# Patient Record
Sex: FEMALE | Race: AMERICAN INDIAN OR ALASKA NATIVE
[De-identification: names, ages, dates, MRNs, and addresses within clinical notes are randomized per-mention and may not be internally consistent; named-entity substitution may affect disease eponyms.]

---

## 2017-02-10 NOTE — MAMMOGRAPHY REPORT
BONE DEXA:02/10/17 10:24:00



CLINICAL: Postmenopausal. No comparison.



TECHNIQUE: Two site bone DEXA performed on an Hologic scanner.





FINDINGS:

The average BMD of the lumbar spine L1-L4 is 1.118g/cm squared with a 

T-score of -0.3 and a Z-score of by 2.1.



The average BMD of the left hip is 0.831g/cm squared with a T-score of 

-1.3 and a Z-score of -0.1.





IMPRESSION:

1. WHO classification: Normal with average fracture risk  based on 

lumbar spine measurements.



2. WHO classification: Osteopenia with increased fracture risk  based 

on left hip measurements.





RECOMMENDATION: Clinical correlation and routine screening.





DEFINITIONS:

  BMD     = Bone Mineral Density

  T-score = BMD related to mean peak bone mass of young adult

            (mean expressed in Standard Deviation)

  Z-score = Age matched BMD expressed in SD



World Health Organization (WHO) Diagnostic Criteria

  Normal             T-score > -1 SD

  Osteopenia      T-score between -1 and -2.4 SD

  Osteoporosis    T-score -2.5 SD or below



NOTE:

      BMD is not the only risk factor for fracture. One should also 

consider factors such as the patient's age, risk of falling, previous 

osteoporotic fracture, family history of osteoporotic fractures, 

current smoker, and low body weight.

Z-scores are not calculated if >80 years of age.

## 2017-02-10 NOTE — MAMMOGRAPHY REPORT
BILATERAL DIGITAL SCREENING MAMMOGRAM with CAD: 02/10/17 10:24:00



CLINICAL: Routine screening.



COMPARISON:01/28/15



FINDINGS: The breasts are heterogeneously dense, which may obscure 

small masses. No mass, architectural distortion or suspicious 

calcifications.



IMPRESSION: No mammographic evidence of malignancy.



BI-RADS CATEGORY: 1 - - Negative



RECOMMENDATION: Routine mammographic screening in one year.





COMMENT:

Patient follow-up letters are generated by our [employer] application.  

The

## 2020-01-16 ENCOUNTER — HOSPITAL ENCOUNTER (EMERGENCY)
Dept: HOSPITAL 5 - ED | Age: 75
Discharge: HOME | End: 2020-01-16
Payer: MEDICARE

## 2020-01-16 VITALS — SYSTOLIC BLOOD PRESSURE: 142 MMHG | DIASTOLIC BLOOD PRESSURE: 74 MMHG

## 2020-01-16 DIAGNOSIS — M54.9: ICD-10-CM

## 2020-01-16 DIAGNOSIS — N20.0: ICD-10-CM

## 2020-01-16 DIAGNOSIS — E78.5: ICD-10-CM

## 2020-01-16 DIAGNOSIS — M54.5: Primary | ICD-10-CM

## 2020-01-16 DIAGNOSIS — E11.9: ICD-10-CM

## 2020-01-16 DIAGNOSIS — Z90.710: ICD-10-CM

## 2020-01-16 DIAGNOSIS — I10: ICD-10-CM

## 2020-01-16 LAB
BILIRUB UR QL STRIP: (no result)
BLOOD UR QL VISUAL: (no result)
PH UR STRIP: 7 [PH] (ref 5–7)
PROT UR STRIP-MCNC: (no result) MG/DL
RBC #/AREA URNS HPF: 5 /HPF (ref 0–6)
UROBILINOGEN UR-MCNC: < 2 MG/DL (ref ?–2)
WBC #/AREA URNS HPF: < 1 /HPF (ref 0–6)

## 2020-01-16 PROCEDURE — 74176 CT ABD & PELVIS W/O CONTRAST: CPT

## 2020-01-16 PROCEDURE — 81001 URINALYSIS AUTO W/SCOPE: CPT

## 2020-01-16 NOTE — EMERGENCY DEPARTMENT REPORT
ED General Adult HPI





- General


Chief complaint: Back Pain/Injury


Stated complaint: LOWER BACK PAIN EXTREME


Time Seen by Provider: 20 13:25


Source: patient


Mode of arrival: Ambulatory


Limitations: No Limitations





- History of Present Illness


Initial comments: 


75yo BF states that she has low-mid back pain that has been present for weeks 

but became worse last night. She denies injury or fall. Pt states that she 

receives some comfort while lying flat but then she begins to have muscle 

spasms.





-: week(s) (3)


Location: back


Radiation: non-radiation


Severity scale (0 -10): 10


Quality: aching


Consistency: constant


Improves with: none


Worsens with: movement


Associated Symptoms: denies other symptoms


Treatments Prior to Arrival: none





- Related Data


                                    Allergies











Allergy/AdvReac Type Severity Reaction Status Date / Time


 


No Known Allergies Allergy   Unverified 20 09:52














ED Review of Systems


ROS: 


Stated complaint: LOWER BACK PAIN EXTREME


Other details as noted in HPI





Constitutional: no symptoms reported


Musculoskeletal: as per HPI





ED Past Medical Hx





- Past Medical History


Previous Medical History?: Yes


Hx Hypertension: Yes


Hx Diabetes: Yes


Additional medical history: Hyperlipidemia





- Surgical History


Past Surgical History?: Yes


Additional Surgical History: hysterectomy, intestin





- Social History


Smoking Status: Never Smoker


Substance Use Type: None





ED Physical Exam





- General


Limitations: No Limitations


General appearance: alert, in no apparent distress





- Head


Head exam: Present: atraumatic, normocephalic





- Eye


Eye exam: Present: normal appearance, PERRL, EOMI


Pupils: Present: normal accommodation





- ENT


ENT exam: Present: normal exam, normal orophraynx





- Neck


Neck exam: Present: normal inspection, full ROM.  Absent: tenderness





- Respiratory


Respiratory exam: Present: normal lung sounds bilaterally.  Absent: respiratory 

distress, wheezes





- Cardiovascular


Cardiovascular Exam: Present: regular rate, normal rhythm, normal heart sounds





- GI/Abdominal


GI/Abdominal exam: Present: soft.  Absent: distended, tenderness





- Rectal


Rectal exam: Present: deferred





- Extremities Exam


Extremities exam: Present: normal inspection, full ROM.  Absent: tenderness





- Back Exam


Back exam: Present: full ROM (limted flexion and extension), vertebral 

tenderness (lower lumbar tenderness).  Absent: CVA tenderness (R), CVA 

tenderness (L)





- Neurological Exam


Neurological exam: Present: alert, altered, oriented X3, normal gait





- Psychiatric


Psychiatric exam: Present: normal affect, normal mood.  Absent: depressed





- Skin


Skin exam: Present: warm, dry, intact





ED Course


                                   Vital Signs











  20





  09:56 10:13 14:52


 


Temperature 98.1 F 98.1 F 97.6 F


 


Pulse Rate 82 82 68


 


Respiratory 18 16 12





Rate   


 


Blood Pressure  145/84 


 


Blood Pressure 145/84  142/74





[Right]   


 


O2 Sat by Pulse 99 99 





Oximetry   














ED Medical Decision Making





- Radiology Data


[facility]


[address] [place] [address]





Cat Scan Report


Signed





Patient: YANIRA SOLER MR#: 


[id number]


: 1945 Acct:[id number]





Age/Sex: 74 / F ADM Date: 20





Loc: ED


Attending Dr:








Ordering Physician: BÁRBARA JOHNSON PA-C


Date of Service: 20


Procedure(s): CT abdomen pelvis wo con


Accession Number(s): [id number]





cc: BÁRBARA JOHNSON PA-C








CT of the abdomen and pelvis without contrast





INDICATION: Back pain





COMPARISON: None





FINDINGS: Lung bases are clear. The liver, spleen, pancreas and adrenal glands 

show no gross


abnormalities with slight nodularity of the adrenal glands likely benign. There 

is a 1 mm stone in


the left mid pole. No right renal calculi are seen. No hydronephrosis or 

perinephric edema. No gross


renal masses. There are gallstones in the gallbladder without definite 

cholecystitis. No biliary


tree dilation. No fluid or adenopathy in the upper abdomen.





CT of the pelvis shows a normal appendix. No ureteral stones are seen. No stone 

fragments seen in


the bladder. Uterus has been removed. No pelvic or inguinal adenopathy. No 

diverticulosis or


diverticulitis. No significant skeletal lesion.





IMPRESSION: Small left intrarenal stone. No ureteral stones or inflammatory 

process.





Automated exposure control was utilized to diminish radiation dose.











Signer Name: Javon Kam MD


Signed: 2020 4:52 PM


Workstation Name: [id number]








Transcribed By: WINSOME


Dictated By: Javon Kam MD


Electronically Authenticated By: Javon Kam MD


Signed Date/Time: 20











DD/DT: 01/16/20 1645


TD/TT:





- Medical Decision Making


75yo BF states that she has low-mid back pain that has been present for weeks 

but became worse last night. She denies injury or fall. Pt states that she 

receives some comfort while lying flat but then she begins to have muscle 

spasms. Pt was informed that her CT scan showed a small left intrarenal stone. 

Pt was explained that her pain is most suggestive of osteoarthritis. She was 

explained to continue Tylenol 500mg BID for pain as needed. Pt verbalized 

understanding and agreed with the plan of care.








Critical care attestation.: 


If time is entered above; I have spent that time in minutes in the direct care 

of this critically ill patient, excluding procedure time.








ED Disposition


Clinical Impression: 


 Low back pain





Disposition: DC-01 TO HOME OR SELFCARE


Is pt being admited?: No


Does the pt Need Aspirin: No


Condition: Stable


Instructions:  Back Pain (ED)


Additional Instructions: 


Pt was explained that her pain is most suggestive of osteoarthritis. She was 

explained to continue Tylenol 500mg BID for pain as needed. Pt verbalized 

understanding and agreed with the plan of care. Pt will f/u with PCP in 3-5 days

 and see ER as needed.








Referrals: 


DANILO BURDICK MD [Primary Care Provider] - 3-5 Days


[facility] [Outside] - 3-5 Days


Time of Disposition: 17:24

## 2020-01-16 NOTE — CAT SCAN REPORT
CT of the abdomen and pelvis without contrast



INDICATION: Back pain



COMPARISON: None



FINDINGS: Lung bases are clear. The liver, spleen, pancreas and adrenal glands show no gross abnormal
ities with slight nodularity of the adrenal glands likely benign. There is a 1 mm stone in the left m
id pole. No right renal calculi are seen. No hydronephrosis or perinephric edema. No gross renal mass
es. There are gallstones in the gallbladder without definite cholecystitis. No biliary tree dilation.
 No fluid or adenopathy in the upper abdomen.



CT of the pelvis shows a normal appendix. No ureteral stones are seen. No stone fragments seen in the
 bladder. Uterus has been removed. No pelvic or inguinal adenopathy. No diverticulosis or diverticuli
tis. No significant skeletal lesion.



IMPRESSION: Small left intrarenal stone. No ureteral stones or inflammatory process.



Automated exposure control was utilized to diminish radiation dose.







Signer Name: Javon Kam MD 

Signed: 1/16/2020 4:52 PM

 Workstation Name: [id number]

## 2022-06-15 ENCOUNTER — HOSPITAL ENCOUNTER (OUTPATIENT)
Dept: HOSPITAL 5 - SPVWC | Age: 77
Discharge: HOME | End: 2022-06-15
Attending: FAMILY MEDICINE
Payer: MEDICARE

## 2022-06-15 DIAGNOSIS — M85.88: ICD-10-CM

## 2022-06-15 DIAGNOSIS — M81.0: ICD-10-CM

## 2022-06-15 DIAGNOSIS — Z12.31: Primary | ICD-10-CM

## 2022-06-15 PROCEDURE — 77067 SCR MAMMO BI INCL CAD: CPT

## 2022-06-15 PROCEDURE — 77080 DXA BONE DENSITY AXIAL: CPT

## 2022-06-15 NOTE — MAMMOGRAPHY REPORT
DEXA BONE DENSITY SCAN



INDICATION / CLINICAL INFORMATION: SCREENING / OSTEOPOROSIS.

77 years Female



COMPARISON: 12/30/2019



LUMBAR SPINE, L1-L4:

- Bone mineral density (BMD) = 1.214 g/cm2.

- T-score = 0.6 

- Change (%) since most recent prior (if available):  -2.6



LEFT HIP, NECK :

- Bone mineral density (BMD) = 0.695 g/cm2.

- T-score = -1.8 

- Change (%) since most recent prior (if available):  +2.4







IMPRESSION:

1. WHO Classification: Osteopenia. Fracture Risk: Increased. 

2. 10-Year Fracture Risk (FRAX) = Major Osteoporotic Not reported.% / Hip: Not reported.%





FRAX generally not reported for patients with normal or osteoporotic BMD, in non-steroid-treated geeta
ents younger than age 50, or in patients undergoing pharmacotherapy







=================================================================

BMD Reporting Guidelines (ISCD, 2015)

=================================================================

BMD Reporting in Postmenopausal Women and in Men Age 50 and Older

- T-scores are preferred.

- The WHO densitometric classification is applicable.



BMD Reporting in Females Prior to Menopause and in Males Younger Than Age 50

- Z-scores, not T-scores, are preferred. This is particularly important in children.

- A Z-score of -2.0 or lower is defined as below the expected range for age, and a Z-score above -2.0
 is within the expected range for age.

- Osteoporosis cannot be diagnosed in men under age 50 on the basis of BMD alone.

- The WHO diagnostic criteria may be applied to women in the menopausal transition.





http://www.iscd.org/official-positions/6457-sbio-misirhre-positions-adult/





Signer Name: Drake Hilton MD 

Signed: 6/15/2022 4:57 PM

Workstation Name: [id number]